# Patient Record
Sex: FEMALE | Race: WHITE | Employment: FULL TIME | URBAN - METROPOLITAN AREA
[De-identification: names, ages, dates, MRNs, and addresses within clinical notes are randomized per-mention and may not be internally consistent; named-entity substitution may affect disease eponyms.]

---

## 2025-01-02 ENCOUNTER — OFFICE VISIT (OUTPATIENT)
Dept: ENDOCRINOLOGY | Age: 40
End: 2025-01-02
Payer: OTHER GOVERNMENT

## 2025-01-02 VITALS
DIASTOLIC BLOOD PRESSURE: 79 MMHG | BODY MASS INDEX: 41.87 KG/M2 | HEIGHT: 67 IN | SYSTOLIC BLOOD PRESSURE: 117 MMHG | WEIGHT: 266.76 LBS | OXYGEN SATURATION: 97 %

## 2025-01-02 DIAGNOSIS — E66.01 MORBID OBESITY: Primary | ICD-10-CM

## 2025-01-02 DIAGNOSIS — R63.5 WEIGHT GAIN: ICD-10-CM

## 2025-01-02 PROCEDURE — 99213 OFFICE O/P EST LOW 20 MIN: CPT | Performed by: INTERNAL MEDICINE

## 2025-01-02 RX ORDER — HYDROXYZINE HYDROCHLORIDE 10 MG/1
TABLET, FILM COATED ORAL
COMMUNITY

## 2025-01-02 RX ORDER — SEMAGLUTIDE 0.25 MG/.5ML
INJECTION, SOLUTION SUBCUTANEOUS
Qty: 3 ML | Refills: 3 | Status: SHIPPED | OUTPATIENT
Start: 2025-01-02 | End: 2025-01-02

## 2025-01-02 RX ORDER — PANTOPRAZOLE SODIUM 20 MG/1
20 TABLET, DELAYED RELEASE ORAL
COMMUNITY

## 2025-01-02 RX ORDER — SEMAGLUTIDE 0.25 MG/.5ML
0.25 INJECTION, SOLUTION SUBCUTANEOUS
Qty: 2 ML | Refills: 0 | COMMUNITY
Start: 2025-01-02

## 2025-01-02 RX ORDER — ROSUVASTATIN CALCIUM 10 MG/1
10 TABLET, COATED ORAL DAILY
COMMUNITY

## 2025-01-02 RX ORDER — TOPIRAMATE 50 MG/1
50 TABLET, FILM COATED ORAL 2 TIMES DAILY
Qty: 60 TABLET | Refills: 3 | Status: SHIPPED | OUTPATIENT
Start: 2025-01-02

## 2025-01-02 ASSESSMENT — ENCOUNTER SYMPTOMS: RESPIRATORY NEGATIVE: 1

## 2025-01-02 NOTE — PROGRESS NOTES
2025    Assessment:       Diagnosis Orders   1. Morbid obesity        2. Weight gain              PLAN:     Start patient on Topamax and Wegovy samples of Wegovy provided patient to follow-up in 3 months time advised to limit caloric intake and increase activity as much as tolerated  More than 50% of 30 minutes spent in patient education counseling  Orders Placed This Encounter   Medications    topiramate (TOPAMAX) 50 MG tablet     Sig: Take 1 tablet by mouth 2 times daily     Dispense:  60 tablet     Refill:  3    DISCONTD: Semaglutide-Weight Management (WEGOVY) 0.25 MG/0.5ML SOAJ SC injection     Si.25 mg once a week     Dispense:  3 mL     Refill:  3         Subjective:     Chief Complaint   Patient presents with    New Patient    Obesity     Vitals:    25 0911   BP: 117/79   SpO2: 97%   Weight: 121 kg (266 lb 12.1 oz)   Height: 1.702 m (5' 7\")     Wt Readings from Last 3 Encounters:   25 121 kg (266 lb 12.1 oz)   16 108.9 kg (240 lb)     BP Readings from Last 3 Encounters:   25 117/79   16 127/90     Patient referred here for morbid obesity weight gain BMI is at 41.7 patient was in bariatric surgery program at [facility] and has dropped off unable to exercise reviewed [facility] has taken phentermine off and on  History of hyperlipidemia denies any history of diabetes wants to look into medications and/or Wegovy to lose weight  [facility]    Weight Management  This is a new problem. The current episode started more than 1 year ago. The problem has been waxing and waning. Associated symptoms include arthralgias and fatigue. The symptoms are aggravated by eating and stress. She has tried nothing for the symptoms. The treatment provided no relief.     No past medical history on file.  No past surgical history on file.  Social History     Socioeconomic History    Marital status:      Spouse name: Not on file    Number of